# Patient Record
Sex: MALE | Race: OTHER | Employment: UNEMPLOYED | URBAN - METROPOLITAN AREA
[De-identification: names, ages, dates, MRNs, and addresses within clinical notes are randomized per-mention and may not be internally consistent; named-entity substitution may affect disease eponyms.]

---

## 2018-12-28 ENCOUNTER — APPOINTMENT (OUTPATIENT)
Dept: ULTRASOUND IMAGING | Age: 53
End: 2018-12-28
Attending: EMERGENCY MEDICINE
Payer: SELF-PAY

## 2018-12-28 ENCOUNTER — HOSPITAL ENCOUNTER (EMERGENCY)
Age: 53
Discharge: HOME | End: 2018-12-28
Attending: EMERGENCY MEDICINE
Payer: SELF-PAY

## 2018-12-28 VITALS
RESPIRATION RATE: 18 BRPM | OXYGEN SATURATION: 98 % | HEIGHT: 67 IN | BODY MASS INDEX: 25.9 KG/M2 | SYSTOLIC BLOOD PRESSURE: 123 MMHG | TEMPERATURE: 97.9 F | WEIGHT: 165 LBS | DIASTOLIC BLOOD PRESSURE: 65 MMHG | HEART RATE: 84 BPM

## 2018-12-28 DIAGNOSIS — N45.1 EPIDIDYMITIS, RIGHT: Primary | ICD-10-CM

## 2018-12-28 LAB
BACTERIA URNS QL MICRO: 0 /HPF
CASTS URNS QL MICRO: 0 /LPF
CRYSTALS URNS QL MICRO: 0 /LPF
EPI CELLS #/AREA URNS HPF: 0 /HPF
MUCOUS THREADS URNS QL MICRO: 0 /LPF
RBC #/AREA URNS HPF: 0 /HPF
WBC URNS QL MICRO: >100 /HPF

## 2018-12-28 PROCEDURE — 74011250636 HC RX REV CODE- 250/636: Performed by: EMERGENCY MEDICINE

## 2018-12-28 PROCEDURE — 87088 URINE BACTERIA CULTURE: CPT

## 2018-12-28 PROCEDURE — 87186 SC STD MICRODIL/AGAR DIL: CPT

## 2018-12-28 PROCEDURE — 87086 URINE CULTURE/COLONY COUNT: CPT

## 2018-12-28 PROCEDURE — 76870 US EXAM SCROTUM: CPT

## 2018-12-28 PROCEDURE — 81003 URINALYSIS AUTO W/O SCOPE: CPT | Performed by: EMERGENCY MEDICINE

## 2018-12-28 PROCEDURE — 81015 MICROSCOPIC EXAM OF URINE: CPT

## 2018-12-28 PROCEDURE — 96372 THER/PROPH/DIAG INJ SC/IM: CPT | Performed by: EMERGENCY MEDICINE

## 2018-12-28 PROCEDURE — 99283 EMERGENCY DEPT VISIT LOW MDM: CPT | Performed by: EMERGENCY MEDICINE

## 2018-12-28 RX ORDER — DOXYCYCLINE HYCLATE 100 MG
100 TABLET ORAL 2 TIMES DAILY
Qty: 20 TAB | Refills: 0 | Status: SHIPPED | OUTPATIENT
Start: 2018-12-28 | End: 2019-01-07

## 2018-12-28 RX ORDER — NAPROXEN SODIUM 550 MG/1
550 TABLET ORAL
Qty: 20 TAB | Refills: 0 | Status: SHIPPED | OUTPATIENT
Start: 2018-12-28

## 2018-12-28 RX ADMIN — CEFTRIAXONE SODIUM 250 MG: 250 INJECTION, POWDER, FOR SOLUTION INTRAMUSCULAR; INTRAVENOUS at 19:22

## 2018-12-28 NOTE — PROGRESS NOTES
present for assessment with Ayan Geiger MD. 
 
 
Thank you, Cherri Huynh [address]  [address] 
[phone] [email or web address]

## 2018-12-28 NOTE — ED PROVIDER NOTES
4:50 PM 
Awaiting  5:10 PM 
Patient reports a four-day history of right groin and right testicular pain. Reports symptoms have continued to worsen. Noticing swelling in his right testicular area. Does report some dysuria. Denies any penile discharge. Denies any fevers or vomiting. Denies any recent trauma. The history is provided by the patient. A  was used. Groin Pain This is a new problem. The current episode started more than 2 days ago. The problem has been gradually worsening. Pertinent negatives include no abdominal pain and no shortness of breath. History reviewed. No pertinent past medical history. History reviewed. No pertinent surgical history. History reviewed. No pertinent family history. Social History Socioeconomic History  Marital status: SINGLE Spouse name: Not on file  Number of children: Not on file  Years of education: Not on file  Highest education level: Not on file Social Needs  Financial resource strain: Not on file  Food insecurity - worry: Not on file  Food insecurity - inability: Not on file  Transportation needs - medical: Not on file  Transportation needs - non-medical: Not on file Occupational History  Not on file Tobacco Use  Smoking status: Not on file Substance and Sexual Activity  Alcohol use: Not on file  Drug use: Not on file  Sexual activity: Not on file Other Topics Concern  Not on file Social History Narrative  Not on file ALLERGIES: Patient has no known allergies. Review of Systems Constitutional: Negative for chills, diaphoresis and fatigue. HENT: Negative for congestion, trouble swallowing and voice change. Eyes: Negative for redness and visual disturbance. Respiratory: Negative for chest tightness and shortness of breath. Cardiovascular: Negative for leg swelling. Gastrointestinal: Negative for abdominal pain and anal bleeding. Endocrine: Negative for polydipsia and polyuria. Genitourinary: Positive for scrotal swelling. Negative for discharge, frequency, hematuria, penile swelling and urgency. Musculoskeletal: Negative for back pain and gait problem. Skin: Positive for color change. Negative for pallor and rash. Neurological: Negative for syncope and speech difficulty. Hematological: Negative for adenopathy. Psychiatric/Behavioral: Negative for behavioral problems and confusion. All other systems reviewed and are negative. Vitals:  
 12/28/18 1600 BP: 121/79 Pulse: 84 Resp: 18 Temp: 97.9 °F (36.6 °C) SpO2: 99% Weight: 74.8 kg (165 lb) Height: 5' 7\" (1.702 m) Physical Exam  
Constitutional: He appears well-developed and well-nourished. HENT:  
Head: Normocephalic and atraumatic. Eyes: EOM are normal. Pupils are equal, round, and reactive to light. Cardiovascular: Normal rate and regular rhythm. Pulmonary/Chest: Effort normal and breath sounds normal.  
Abdominal: Soft. Bowel sounds are normal.  
Genitourinary: Right testis shows swelling and tenderness. Circumcised. No discharge found. Musculoskeletal: Normal range of motion. He exhibits no edema or deformity. Neurological: He is alert. Skin: Skin is warm and dry. There is erythema. Nursing note and vitals reviewed. MDM Number of Diagnoses or Management Options Diagnosis management comments: We'll send for testicular ultrasound, different diagnosis includes scrotal abscess, epididymitis, scrotal wall cellulitis Amount and/or Complexity of Data Reviewed Clinical lab tests: ordered and reviewed Tests in the radiology section of CPT®: ordered and reviewed Risk of Complications, Morbidity, and/or Mortality Presenting problems: moderate Diagnostic procedures: low Management options: moderate Patient Progress Patient progress: stable Procedures

## 2018-12-28 NOTE — PROGRESS NOTES
present for triage with Jin Zuniga Thank you, Dafne Martinez [address]  [address] 
[phone] [email or web address]

## 2018-12-28 NOTE — ED TRIAGE NOTES
Pt reports R testicular pain and swelling x 4 days,  Feels pressure to urinate and is able to do so, however has burning.

## 2018-12-28 NOTE — PROGRESS NOTES
present for ultrasound. Thank you, Andreina Holt [address]  [address] 
[phone] [email or web address]

## 2018-12-29 NOTE — ED NOTES
I have reviewed discharge instructions with the patient. The patient verbalized understanding. Patient left ED via Discharge Method: ambulatory to Home with family. Opportunity for questions and clarification provided. Patient given 2 scripts. To continue your aftercare when you leave the hospital, you may receive an automated call from our care team to check in on how you are doing. This is a free service and part of our promise to provide the best care and service to meet your aftercare needs.  If you have questions, or wish to unsubscribe from this service please call [phone]. Thank you for Choosing our [facility] [employer] [facility].

## 2018-12-29 NOTE — PROGRESS NOTES
present for injection with Bakari Kidd RN and test results with Reshma Riley MD. 
 
 
Thank you, Gary Hall [address]  [address] 
[phone] [email or web address]

## 2018-12-29 NOTE — PROGRESS NOTES
present for discharge instructions. Thank you, Andreina Holt [address]  [address] 
[phone] [email or web address]

## 2018-12-29 NOTE — DISCHARGE INSTRUCTIONS
Take medications as prescribed  Follow-up with urology  Return to the ER if any new or worsening symptoms      Epididimitis y orquitis: Instrucciones de cuidado - [ Epididymitis and Orchitis: Care Instructions ]  Instrucciones de cuidado    La epididimitis es dolor e hinchazón del tubo que está adherido a cada testículo. Samara tubo se llama epidídimo. La orquitis es dolor e hinchazón del testículo. Las infecciones por bacterias suelen causar estas afecciones. Las infecciones de transmisión sexual (STI, por vasyl siglas en inglés) también pueden causar ambas afecciones. Samara suele ser el charito de hombres menores de [address]. Otras causas en niños varones y hombres mayores son las infecciones a causa de [place] o por armin sonda que drena la [facility]. El virus de las paperas también puede causar orquitis. Los antiinflamatorios o los analgésicos (medicamentos para el dolor) pueden ayudar con el dolor. Se usan antibióticos si el problema es causado por bacterias. No se usan si la causa es un virus. Es posible que el testículo siga hinchado por muchos días o incluso algunas semanas. El médico lo hwang examinado minuciosamente, rosa isela pueden producirse problemas más tarde. Si nota algún problema o nuevos síntomas, busque tratamiento médico de inmediato. La atención de seguimiento es armin parte clave de rosado tratamiento y seguridad. Asegúrese de hacer y acudir a todas las citas, y llame a rosado médico si está teniendo problemas. También es armin buena idea saber los resultados de vasyl exámenes y mantener armin lista de los medicamentos que waylon. ¿Cómo puede cuidarse en el hogar? · Si rosado médico le recetó antibióticos, tómelos según las indicaciones. No deje de tomarlos solo porque se sienta mejor. Debe homar todos los antibióticos hasta terminarlos. · Pregúntele a rosado médico si puede homar un analgésico (medicamento para el dolor) de venta sunil, monica acetaminofén (Tylenol), ibuprofeno (Advil, Motrin) o naproxeno (Aleve).  Sea malcolm con los medicamentos. Maryse y siga todas las instrucciones de la [place]. · Restrinja barger actividad a lo que sea cómodo. · Use ropa interior ajustada o un suspensorio deportivo. [place] puede ayudar a reducir el dolor. · Aplíquese frío o calor en la gracie hinchada. Use el que le dé mejores resultados para barger dolor. Sentarse en un baño de agua tibia por 15 minutos dos veces al día ayudará a reducir la hinchazón con Earla Pallas. · Si le moses dicho que armin STI pudo tammy causado barger afección, no tenga relaciones sexuales hasta que barger médico le diga que ya es seguro. [place] impedirá que se propague la infección. Dígale a barger lebron o parejas sexuales que deben hacerse evaluar. Es posible que necesiten tratamiento. ¿Cuándo debe pedir ayuda? Llame a barger médico ahora mismo o busque atención médica inmediata si:    · Abrger dolor empeora.     · Tiene fiebre nueva o más siddharth.     · Tiene nueva o mayor hinchazón en el testículo.     · Tiene un nuevo dolor abdominal o barger dolor empeora.    Vigile de cerca los cambios en barger deepika y asegúrese de comunicarse con barger médico si:    · No mejora monica se esperaba. ¿Dónde puede encontrar más información en inglés? Kerri Streeter a http://bruno-tee.info/. Margot Stone S360 en la búsqueda para aprender más acerca de \"Epididimitis y orquitis: Instrucciones de cuidado - [ Epididymitis and Orchitis: Care Instructions ]. \"  Revisado: 21 marzo, 2018  Versión del contenido: 11.8  © 3581-2615 [employer]. Las instrucciones de cuidado fueron adaptadas bajo licencia por [employer] (which disclaims liability or warranty for this information). Si usted tiene [place] afección médica o sobre estas instrucciones, siempre pregunte a barger profesional de deepika. [facility] niega toda garantía o responsabilidad por barger uso de esta información.

## 2018-12-31 LAB
BACTERIA SPEC CULT: ABNORMAL
SERVICE CMNT-IMP: ABNORMAL